# Patient Record
Sex: FEMALE | Race: BLACK OR AFRICAN AMERICAN | NOT HISPANIC OR LATINO | Employment: FULL TIME | URBAN - METROPOLITAN AREA
[De-identification: names, ages, dates, MRNs, and addresses within clinical notes are randomized per-mention and may not be internally consistent; named-entity substitution may affect disease eponyms.]

---

## 2020-11-02 ENCOUNTER — HISTORICAL (OUTPATIENT)
Dept: LAB | Facility: HOSPITAL | Age: 55
End: 2020-11-02

## 2020-11-02 LAB
ABS NEUT (OLG): 6.22
ALBUMIN SERPL-MCNC: 4.1 GM/DL (ref 3.5–5)
ALBUMIN/GLOB SERPL: 1 RATIO (ref 1.1–2)
ALP SERPL-CCNC: 145 UNIT/L (ref 40–150)
ALT SERPL-CCNC: 15 UNIT/L (ref 0–55)
ANISOCYTOSIS BLD QL SMEAR: SLIGHT
AST SERPL-CCNC: 21 UNIT/L (ref 5–34)
BASOPHILS # BLD AUTO: 0.08 X10(3)/MCL
BASOPHILS NFR BLD AUTO: 0.8 %
BILIRUB SERPL-MCNC: 0.3 MG/DL
BILIRUBIN DIRECT+TOT PNL SERPL-MCNC: 0.1 MG/DL (ref 0–0.5)
BILIRUBIN DIRECT+TOT PNL SERPL-MCNC: 0.2 MG/DL
BUN SERPL-MCNC: 14 MG/DL (ref 9.8–20.1)
CALCIUM SERPL-MCNC: 9.5 MG/DL (ref 8.4–10.2)
CHLORIDE SERPL-SCNC: 101 MMOL/L (ref 98–107)
CO2 SERPL-SCNC: 29 MEQ/L (ref 22–29)
CREAT SERPL-MCNC: 0.77 MG/DL (ref 0.55–1.02)
EOSINOPHIL # BLD AUTO: 0.43 X10(3)/MCL
EOSINOPHIL NFR BLD AUTO: 4.3 %
ERYTHROCYTE [DISTWIDTH] IN BLOOD BY AUTOMATED COUNT: 14 %
GGT SERPL-CCNC: 20 U/L (ref 9–36)
GLOBULIN SER-MCNC: 4 GM/DL (ref 2.4–3.5)
GLUCOSE SERPL-MCNC: 97 MG/DL (ref 74–100)
HCT VFR BLD AUTO: 43.6 % (ref 34–46)
HGB BLD-MCNC: 14.4 GM/DL (ref 11.3–15.4)
IMM GRANULOCYTES # BLD AUTO: 0.02 10*3/UL (ref 0–0.1)
IMM GRANULOCYTES NFR BLD AUTO: 0.2 % (ref 0–1)
LYMPHOCYTES # BLD AUTO: 2.3 X10(3)/MCL
LYMPHOCYTES NFR BLD AUTO: 22.9 %
MACROCYTES BLD QL SMEAR: SLIGHT
MCH RBC QN AUTO: 26.2 PG (ref 27–33)
MCHC RBC AUTO-ENTMCNC: 33 GM/DL (ref 32–35)
MCV RBC AUTO: 79.4 FL (ref 81–97)
MICROCYTES BLD QL SMEAR: ABNORMAL
MONOCYTES # BLD AUTO: 1 X10(3)/MCL
MONOCYTES NFR BLD AUTO: 10 %
NEUTROPHILS # BLD AUTO: 6.22 X10(3)/MCL
NEUTROPHILS NFR BLD AUTO: 61.8 %
PLATELET # BLD AUTO: 1017 X10(3)/MCL (ref 140–450)
PLATELET # BLD EST: ABNORMAL 10*3/UL
PMV BLD AUTO: 8 FL
POTASSIUM SERPL-SCNC: 3.7 MMOL/L (ref 3.5–5.1)
PROT SERPL-MCNC: 8.1 GM/DL (ref 6.4–8.3)
RBC # BLD AUTO: 5.49 X10(6)/MCL (ref 3.9–5)
SODIUM SERPL-SCNC: 138 MMOL/L (ref 136–145)
WBC # SPEC AUTO: 10.05 X10(3)/MCL (ref 3.4–9.2)

## 2021-10-06 ENCOUNTER — HISTORICAL (OUTPATIENT)
Dept: RADIOLOGY | Facility: HOSPITAL | Age: 56
End: 2021-10-06

## 2022-04-10 ENCOUNTER — HISTORICAL (OUTPATIENT)
Dept: ADMINISTRATIVE | Facility: HOSPITAL | Age: 57
End: 2022-04-10

## 2022-04-26 VITALS
SYSTOLIC BLOOD PRESSURE: 125 MMHG | WEIGHT: 153 LBS | HEIGHT: 67 IN | BODY MASS INDEX: 24.01 KG/M2 | OXYGEN SATURATION: 98 % | DIASTOLIC BLOOD PRESSURE: 77 MMHG

## 2022-05-24 ENCOUNTER — OFFICE VISIT (OUTPATIENT)
Dept: HEMATOLOGY/ONCOLOGY | Facility: CLINIC | Age: 57
End: 2022-05-24
Payer: COMMERCIAL

## 2022-05-24 VITALS
OXYGEN SATURATION: 100 % | HEART RATE: 76 BPM | BODY MASS INDEX: 22.38 KG/M2 | DIASTOLIC BLOOD PRESSURE: 80 MMHG | WEIGHT: 147.69 LBS | SYSTOLIC BLOOD PRESSURE: 143 MMHG | RESPIRATION RATE: 20 BRPM | HEIGHT: 68 IN | TEMPERATURE: 98 F

## 2022-05-24 DIAGNOSIS — D47.3 ESSENTIAL THROMBOCYTOSIS: Primary | ICD-10-CM

## 2022-05-24 PROCEDURE — 99214 OFFICE O/P EST MOD 30 MIN: CPT | Mod: ,,, | Performed by: NURSE PRACTITIONER

## 2022-05-24 PROCEDURE — 99214 PR OFFICE/OUTPT VISIT, EST, LEVL IV, 30-39 MIN: ICD-10-PCS | Mod: ,,, | Performed by: NURSE PRACTITIONER

## 2022-05-24 RX ORDER — HYDROXYUREA 500 MG/1
1000 CAPSULE ORAL DAILY
Qty: 60 CAPSULE | Refills: 5 | Status: SHIPPED | OUTPATIENT
Start: 2022-05-24 | End: 2023-01-17 | Stop reason: SDUPTHER

## 2022-05-24 RX ORDER — HYDROXYUREA 500 MG/1
1000 CAPSULE ORAL DAILY
COMMUNITY
Start: 2022-04-14 | End: 2022-05-24 | Stop reason: SDUPTHER

## 2022-05-24 RX ORDER — ASPIRIN 81 MG/1
81 TABLET ORAL DAILY
COMMUNITY

## 2022-05-27 NOTE — PROGRESS NOTES
[facility]    PATIENT: Colin Coley  MRN: [id number]  DATE: 5/27/2022    Diagnosis:   1. Essential thrombocytosis        Chief Complaint: no complaints.     Oncologic History:  Diagnosis  Essential (hemorrhagic) thrombocythemia -   Essential (hemorrhagic) thrombocythemia -   Essential thrombocytosis - 07/28/2021  Essential thrombocytosis - 06/14/2021  Essential thrombocytosis - 04/28/2021  Essential thrombocytosis - 03/17/2021  Essential thrombocytosis - 02/08/2021  Essential thrombocytosis - 01/06/2021  Thrombocytosis - 12/08/2020  Thrombocytosis - 11/11/2020  Stage  No qualifying data available  Treatment Plan  No qualifying data available     Problem List:   1.  Essential Thrombocytosis CALR +  2.  Leukocytosis    Treatment Plan:  1. hydroxyurea 1000mg Q day initiated 1/6/2021  ASA 81 mg a day    Diagnosis/Treatment/HPI:   55-year-old healthy female who presents for evaluation of thrombocytosis.  Per the patient's report, she was told that she had elevated platelet count in 2018.  She was taking care of a lot of people in her family and therefore did not get this worked up.  She went in for a wellness exam on 10/29/2020, blood work was done that revealed a white blood cell count of 10.9, hemoglobin 14.4, hematocrit of 44.8, MCV of 82 with a platelet count of 1,001,000.  Repeat CBC on 11/2/2020 again showed an elevated white blood cell count at 10.05, hemoglobin of 14.4, hematocrit of 43.6, and platelet count of 1,017,000.  A peripheral blood smear on the day showed a microcytic, hypochromic red blood cell population which showed some rouleaux formation and a few nucleated red blood cells.  There was mild leukocytosis with normal leukocyte differential and morphology.  No blasts were seen.  There was marked thrombocytosis with variably sized platelets.  There were also a few small platelet clumps.  Patient was referred to hematology.  She initially saw hematology on 11/11/2020.  At  that visit she stated that she had no problems other than insomnia.  Bone marrow biopsy was done on 12/2/2020.  This revealed a normocellular marrow, 40% with trilineage hematopoiesis, some clustered megakaryocytes, presence of storage iron with no increased blasts.  Flow cytometry showed no diagnostic immunophenotypic abnormalities detected.  Extended reflex panel for myeloproliferative neoplasm did show a CALR mutation.  Patient diagnosed with essential thrombocytopenia       PMHx:  None     PSHx:   1.  DARINEL/BSO in 1992  2.  Breast biopsies for fibrocystic disease  3.  Fibroids of uterus leading to hysterectomy in 1992    Social Hx:   She denies tobacco, alcohol, or illicit drug use.    Family Hx:   Her mother had heart failure requiring a pacemaker.  She has no family history of blood disorders including thrombocytosis.    Subjective:     Interval History:   2/8/2021:PLT  477,000. WBC 6.7 Patient presents today for 4-week follow-up visit since starting Hydrea 1000 mg p.o. daily.  She has been tolerating medication well and taking at bedtime.  States slight throat irritation with med.  Denies difficulty breathing or swallowing.  States she sleeps fine after taking Hydrea at bedtime.  She continues to take aspirin daily.  She has no major issues or complaints. She has not had any new signs or symptoms to suggest cardiovascular disease, stroke, or bleeding.  Denies any new abnormal bruising or bleeding.  No further questions or concerns.    3/17/21  K  WBC 5.7, HGB 12.8, CMP WNL on hydrea 1000mg a day    4/28/21: Patient presents today for 6 week follow-up. She has been on Hydrea 1000 mg daily since January 2021. Her platelet count has trended down nicely currently at 300,000. She does complain of increased fatigue. I suspect we might have to decrease dosing at next visit. WBC is 5.7  H&H is 13 and 38.2.    6/9/21 PLT 460K. She held her hydrea x 2 weeks because she had a sinus infection and  did not know how  antibiotics would affect the hydrea. She resumed her hydrea on june 7th which is the same day that she got her labs. She stopped her vitamins. She is still on ASA.   7/26/2021 WBC 5.3, HGB 12.6, HCT 37.6, PLT 341K  10/27/21 WBC 5.6, HGB 12.6, PLT 318K. 10/25/21 MMG benign. No complaints.    05/24/2022 - patient presents to clinic today for a 6 month follow up visit Essential Thrombocytosis - CALR +/leukocytosis.   She is feeling well. Has no complaints. Denies N/V, mouth sores, diarrhea or constipation. Reports appetite as good.   She continues to take Hydrea 1000 mg daily and ASA 81 mg daily. Reports compliance.   She is to be scheduled later this year for a screening colonoscopy through Dr. Warren.   Up to date on mammogram.     Past Medical History:   Diagnosis Date    Anemia, unspecified     Fibrocystic disease of breast     Sickle cell trait      Past Surgical History:   Procedure Laterality Date    BONE MARROW BIOPSY  12/02/2020    BREAST BIOPSY  02/08/2021    BREAST LUMPECTOMY      Excision of Fibroadenoma of breast      TOTAL ABDOMINAL HYSTERECTOMY W/ BILATERAL SALPINGOOPHORECTOMY  1992     Family History   Problem Relation Age of Onset    Hypertension Mother     Rheum arthritis Father     Bipolar disorder Sister     Hypothyroidism Sister        Social History:  reports that she has never smoked. She has never used smokeless tobacco. She reports previous alcohol use. She reports that she does not use drugs.    Allergies:  Review of patient's allergies indicates:  No Known Allergies    Medications:  Current Outpatient Medications   Medication Sig Dispense Refill    aspirin (ECOTRIN) 81 MG EC tablet Take 81 mg by mouth once daily.      hydroxyurea (HYDREA) 500 mg Cap Take 2 capsules (1,000 mg total) by mouth once daily. 60 capsule 5     No current facility-administered medications for this visit.      Review of Systems   14 point review of systems done in full with pertinent positives as described  "above  Remainder of review systems done in full and unremarkable.    ECOG Performance Status: 0   Objective:      Vitals:   Vitals:    05/24/22 1410   BP: (!) 143/80   BP Location: Right arm   Patient Position: Sitting   BP Method: Medium (Automatic)   Pulse: 76   Resp: 20   Temp: 98.1 °F (36.7 °C)   TempSrc: Oral   SpO2: 100%   Weight: 67 kg (147 lb 11.2 oz)   Height: 5' 8" (1.727 m)     BMI: Body mass index is 22.46 kg/m².    Laboratory results: Normal CBC, CMP 5/19/2022       Imaging: 10/6/21 MG JHONATAN SCREENING BILATERAL      There is no mammographic evidence of malignancy.     Assessment:     1.  Essential thrombocytosis  2.  Leukocytosis  3.  CALR mutation +    Plan:  -We will continue aspirin daily and hydroxyurea 1000 mg p.o. daily. Platelets are stable.     -She will come back in 6 months for labs w/ CBC, CMP.  Will get a CBC and CMP in 3 months. Goal platelet count is 100,000 to  400,000 (Per Dr. Christy).      -It was already explained that this is a type of myeloproliferative neoplasm, suggesting a bone marrow cancer, but it this is very treatable.  The patient voiced understanding.    All questions were answered to the best of my ability and she understands the plan moving forward.      Anisha Anderson NP-C         Orders Placed This Encounter   Procedures    CBC Auto Differential    Comprehensive Metabolic Panel    CBC with Differential     Answers for HPI/ROS submitted by the patient on 5/24/2022  appetite change : No  unexpected weight change: No  mouth sores: No  visual disturbance: No  cough: No  shortness of breath: No  chest pain: No  abdominal pain: No  diarrhea: No  frequency: No  back pain: No  rash: No  headaches: No  adenopathy: No  nervous/ anxious: No      "

## 2022-06-27 ENCOUNTER — OFFICE VISIT (OUTPATIENT)
Dept: FAMILY MEDICINE | Facility: CLINIC | Age: 57
End: 2022-06-27
Payer: COMMERCIAL

## 2022-06-27 VITALS
SYSTOLIC BLOOD PRESSURE: 132 MMHG | TEMPERATURE: 97 F | BODY MASS INDEX: 22.43 KG/M2 | WEIGHT: 148 LBS | HEART RATE: 71 BPM | DIASTOLIC BLOOD PRESSURE: 81 MMHG | RESPIRATION RATE: 20 BRPM | HEIGHT: 68 IN

## 2022-06-27 DIAGNOSIS — J32.0 MAXILLARY SINUSITIS, UNSPECIFIED CHRONICITY: ICD-10-CM

## 2022-06-27 DIAGNOSIS — H60.92 OTITIS EXTERNA OF LEFT EAR, UNSPECIFIED CHRONICITY, UNSPECIFIED TYPE: ICD-10-CM

## 2022-06-27 PROCEDURE — 99203 PR OFFICE/OUTPT VISIT, NEW, LEVL III, 30-44 MIN: ICD-10-PCS | Mod: S$GLB,,, | Performed by: NURSE PRACTITIONER

## 2022-06-27 PROCEDURE — 99203 OFFICE O/P NEW LOW 30 MIN: CPT | Mod: S$GLB,,, | Performed by: NURSE PRACTITIONER

## 2022-06-27 RX ORDER — AMOXICILLIN 875 MG/1
875 TABLET, FILM COATED ORAL EVERY 12 HOURS
Qty: 20 TABLET | Refills: 0 | Status: SHIPPED | OUTPATIENT
Start: 2022-06-27 | End: 2022-07-07

## 2022-06-27 RX ORDER — LORATADINE 10 MG/1
10 TABLET ORAL DAILY
Qty: 30 TABLET | Refills: 6 | Status: SHIPPED | OUTPATIENT
Start: 2022-06-27 | End: 2022-11-29

## 2022-06-27 NOTE — PROGRESS NOTES
Subjective:       Patient ID: Colin Coley is a 56 y.o. female.    Chief Complaint: Otalgia (Left earache, sore throat X's 5 days)    Vitals:    06/27/22 1127   BP: 132/81   Pulse: 71   Resp: 20   Temp: 97.3 °F (36.3 °C)        The patient is a 56-year-old female who presents complaining of left upper jaw pain and left ear pain.  Also states she has a  sore throat.  Relief measures at home have been none.      Review of Systems   Constitutional: Positive for fatigue.   HENT: Positive for nasal congestion, postnasal drip, sinus pressure/congestion and sore throat.    Eyes: Negative.    Respiratory: Negative.    Cardiovascular: Negative.    Gastrointestinal: Negative.    Endocrine: Negative.    Genitourinary: Negative.    Musculoskeletal: Negative.    Integumentary:  Negative.   Neurological: Negative.            Objective:        Physical Exam  Vitals reviewed.   Constitutional:       Appearance: Normal appearance.   HENT:      Ears:      Comments: TMs are bulging bilaterally.  Left ear canal near TM pus pocket     Nose: Congestion present.      Mouth/Throat:      Mouth: Mucous membranes are moist.      Pharynx: Oropharyngeal exudate and posterior oropharyngeal erythema present.   Eyes:      Extraocular Movements: Extraocular movements intact.   Cardiovascular:      Rate and Rhythm: Normal rate and regular rhythm.      Pulses: Normal pulses.      Heart sounds: No murmur heard.  Pulmonary:      Effort: Pulmonary effort is normal.      Breath sounds: Normal breath sounds.   Abdominal:      General: Bowel sounds are normal.      Palpations: Abdomen is soft.   Musculoskeletal:         General: Normal range of motion.      Cervical back: Normal range of motion and neck supple.   Skin:     General: Skin is warm and dry.   Neurological:      Mental Status: She is alert and oriented to person, place, and time.         Assessment:    Maxillary sinusitis    Otitis externa    Problem List Items Addressed This Visit        ENT     Otitis externa of left ear    Maxillary sinusitis          Plan:       Antibiotics/antihistamines    Cortisporin otic      Past Medical History:   Diagnosis Date    Anemia, unspecified     Fibrocystic disease of breast     Sickle cell trait         Review of patient's allergies indicates:  No Known Allergies     Current Outpatient Medications   Medication Sig Dispense Refill    aspirin (ECOTRIN) 81 MG EC tablet Take 81 mg by mouth once daily.      hydroxyurea (HYDREA) 500 mg Cap Take 2 capsules (1,000 mg total) by mouth once daily. 60 capsule 5    amoxicillin (AMOXIL) 875 MG tablet Take 1 tablet (875 mg total) by mouth every 12 (twelve) hours. for 10 days 20 tablet 0    loratadine (CLARITIN) 10 mg tablet Take 1 tablet (10 mg total) by mouth once daily. 30 tablet 6    neomycin-colist-HC-thonzonium (COLY-MYCIN S) 3.3-3-10-0.5 mg/mL DrpS Place 2 drops into the left ear 3 (three) times daily. 10 mL 0     No current facility-administered medications for this visit.          Patient Active Problem List   Diagnosis    Otitis externa of left ear    Maxillary sinusitis             Past Medical History:   Diagnosis Date    Anemia, unspecified     Fibrocystic disease of breast     Sickle cell trait           Past Surgical History:   Procedure Laterality Date    BONE MARROW BIOPSY  12/02/2020    BREAST BIOPSY  02/08/2021    BREAST LUMPECTOMY      Excision of Fibroadenoma of breast      HYSTERECTOMY      TOTAL ABDOMINAL HYSTERECTOMY W/ BILATERAL SALPINGOOPHORECTOMY  1992           reports that she has never smoked. She has never used smokeless tobacco. She reports previous alcohol use. She reports that she does not use drugs.      There is no immunization history on file for this patient.     Health Maintenance   Topic Date Due    Hepatitis C Screening  Never done    Lipid Panel  Never done    TETANUS VACCINE  Never done    Mammogram  10/06/2022

## 2022-09-06 DIAGNOSIS — D47.3 ESSENTIAL THROMBOCYTOSIS: ICD-10-CM

## 2022-09-06 RX ORDER — HYDROXYUREA 500 MG/1
1000 CAPSULE ORAL DAILY
Qty: 60 CAPSULE | Refills: 5 | OUTPATIENT
Start: 2022-09-06

## 2022-09-07 DIAGNOSIS — D47.3 THROMBOCYTHEMIA, ESSENTIAL: Primary | ICD-10-CM

## 2022-09-26 ENCOUNTER — TELEPHONE (OUTPATIENT)
Dept: HEMATOLOGY/ONCOLOGY | Facility: CLINIC | Age: 57
End: 2022-09-26
Payer: COMMERCIAL

## 2022-09-26 NOTE — TELEPHONE ENCOUNTER
----- Message from Anisha Anderson NP sent at 9/23/2022 11:46 AM CDT -----  Regarding: labs for refill on Hydrea  Can you check and see if patient had labs done.   She called and asked for refill on Hydrea around the 8th of September.   I do not see lab results and do not see that her Hydrea was filled.   Thanks, Jigar Luque

## 2022-11-29 ENCOUNTER — OFFICE VISIT (OUTPATIENT)
Dept: HEMATOLOGY/ONCOLOGY | Facility: CLINIC | Age: 57
End: 2022-11-29
Payer: COMMERCIAL

## 2022-11-29 VITALS
BODY MASS INDEX: 23.52 KG/M2 | TEMPERATURE: 98 F | WEIGHT: 155.19 LBS | RESPIRATION RATE: 20 BRPM | DIASTOLIC BLOOD PRESSURE: 81 MMHG | OXYGEN SATURATION: 98 % | HEIGHT: 68 IN | HEART RATE: 74 BPM | SYSTOLIC BLOOD PRESSURE: 143 MMHG

## 2022-11-29 DIAGNOSIS — Z12.31 BREAST CANCER SCREENING BY MAMMOGRAM: ICD-10-CM

## 2022-11-29 DIAGNOSIS — D47.3 ESSENTIAL THROMBOCYTOSIS: Primary | ICD-10-CM

## 2022-11-29 DIAGNOSIS — Z12.11 ENCOUNTER FOR SCREENING COLONOSCOPY: ICD-10-CM

## 2022-11-29 PROCEDURE — 99214 OFFICE O/P EST MOD 30 MIN: CPT | Mod: ,,, | Performed by: NURSE PRACTITIONER

## 2022-11-29 PROCEDURE — 99214 PR OFFICE/OUTPT VISIT, EST, LEVL IV, 30-39 MIN: ICD-10-PCS | Mod: ,,, | Performed by: NURSE PRACTITIONER

## 2022-11-29 NOTE — PROGRESS NOTES
[facility]    PATIENT: Colin Coley  MRN: [id number]  DATE: 11/29/2022    Diagnosis:   1.  Essential thrombocytosis  2.  Leukocytosis  3.  CALR mutation +    Chief Complaint:  Fatigue    Oncologic History:  Diagnosis  Essential (hemorrhagic) thrombocythemia -   Essential (hemorrhagic) thrombocythemia -   Essential thrombocytosis - 07/28/2021  Essential thrombocytosis - 06/14/2021  Essential thrombocytosis - 04/28/2021  Essential thrombocytosis - 03/17/2021  Essential thrombocytosis - 02/08/2021  Essential thrombocytosis - 01/06/2021  Thrombocytosis - 12/08/2020  Thrombocytosis - 11/11/2020  Stage  No qualifying data available  Treatment Plan  No qualifying data available     Problem List:   1.  Essential Thrombocytosis CALR +  2.  Leukocytosis    Treatment Plan:  1. hydroxyurea 1000mg Q day initiated 1/6/2021  ASA 81 mg a day    Diagnosis/Treatment/HPI:   55-year-old healthy female who presents for evaluation of thrombocytosis.  Per the patient's report, she was told that she had elevated platelet count in 2018.  She was taking care of a lot of people in her family and therefore did not get this worked up.  She went in for a wellness exam on 10/29/2020, blood work was done that revealed a white blood cell count of 10.9, hemoglobin 14.4, hematocrit of 44.8, MCV of 82 with a platelet count of 1,001,000.  Repeat CBC on 11/2/2020 again showed an elevated white blood cell count at 10.05, hemoglobin of 14.4, hematocrit of 43.6, and platelet count of 1,017,000.  A peripheral blood smear on the day showed a microcytic, hypochromic red blood cell population which showed some rouleaux formation and a few nucleated red blood cells.  There was mild leukocytosis with normal leukocyte differential and morphology.  No blasts were seen.  There was marked thrombocytosis with variably sized platelets.  There were also a few small platelet clumps.  Patient was referred to hematology.  She initially saw  hematology on 11/11/2020.  At that visit she stated that she had no problems other than insomnia.  Bone marrow biopsy was done on 12/2/2020.  This revealed a normocellular marrow, 40% with trilineage hematopoiesis, some clustered megakaryocytes, presence of storage iron with no increased blasts.  Flow cytometry showed no diagnostic immunophenotypic abnormalities detected.  Extended reflex panel for myeloproliferative neoplasm did show a CALR mutation.  Patient diagnosed with essential thrombocytopenia       PMHx:  None     PSHx:   1.  DARINEL/BSO in 1992  2.  Breast biopsies for fibrocystic disease  3.  Fibroids of uterus leading to hysterectomy in 1992    Social Hx:   She denies tobacco, alcohol, or illicit drug use.    Family Hx:   Her mother had heart failure requiring a pacemaker.  She has no family history of blood disorders including thrombocytosis.    Subjective:     Interval History:   11/29/2022: patient presents to clinic today for a 6 month follow up visit for ET(Essential thrombocytosis).   She has been taking Hydrea 1000 mg and ASA 81 mg, but not on a consistent basis.  She takes Hydrea at bedtime when she does take it, 2 capsules daily  States she will stop taking Hydrea if she has to take another medication like a cold medication or nonsteroidal anti-inflammatory.    States she is concerned about potential medication interactions and that is why she does this.  She denies any potential side effects of Hydrea, such as hair thinning or loss, nausea or vomiting, diarrhea, mouth sores or decrease in appetite.  She is not been in the hospital or the ER since last visit.  She has not had any signs or symptoms to suggest cardiovascular disease, stroke, blood clots or bleeding.   She did not make the appointment for screening colonoscopy through Dr. Warren.  States they never contacted her for an appointment.  She never called to follow-up.  She is due for a mammogram.  Last mammogram was October 2021.  Normal.   "She does not perform self-breast exams.  She reports stress in her life from extended family living with her.  States causes her trouble sleeping.  Inquires what she can take.    Past Medical History:   Diagnosis Date    Anemia, unspecified     Fibrocystic disease of breast     Sickle cell trait      Past Surgical History:   Procedure Laterality Date    BONE MARROW BIOPSY  12/02/2020    BREAST BIOPSY  02/08/2021    BREAST LUMPECTOMY      Excision of Fibroadenoma of breast      HYSTERECTOMY      TOTAL ABDOMINAL HYSTERECTOMY W/ BILATERAL SALPINGOOPHORECTOMY  1992     Family History   Problem Relation Age of Onset    Hypertension Mother     Rheum arthritis Father     Bipolar disorder Sister     Hypothyroidism Sister        Social History:  reports that she has never smoked. She has never used smokeless tobacco. She reports that she does not currently use alcohol. She reports that she does not use drugs.    Allergies:  Review of patient's allergies indicates:  No Known Allergies    Medications:  Current Outpatient Medications   Medication Sig Dispense Refill    aspirin (ECOTRIN) 81 MG EC tablet Take 81 mg by mouth once daily.      hydroxyurea (HYDREA) 500 mg Cap Take 2 capsules (1,000 mg total) by mouth once daily. 60 capsule 5    loratadine (CLARITIN) 10 mg tablet Take 1 tablet (10 mg total) by mouth once daily. 30 tablet 6    neomycin-colist-HC-thonzonium (COLY-MYCIN S) 3.3-3-10-0.5 mg/mL DrpS Place 2 drops into the left ear 3 (three) times daily. 10 mL 0     No current facility-administered medications for this visit.      Review of Systems   14 point review of systems done in full with pertinent positives as described above  Remainder of review systems done in full and unremarkable.    ECOG Performance Status: 0   Objective:   Vitals:Blood pressure (!) 143/81, pulse 74, temperature 97.9 °F (36.6 °C), temperature source Oral, resp. rate 20, height 5' 8" (1.727 m), weight 70.4 kg (155 lb 3.2 oz), SpO2 98 " %.    Physical exam:  General: Alert and oriented. Well groomed. Well nourished. No acute distress.   Eye: PERRL, EOMI, clear conjunctiva, sclerae anicteric  Neck:  Supple, no thyromegaly or lymphadenopathy.   Chest/breasts:  Bilateral breasts are without nodules, masses or tenderness.  Bilateral nipples are inverted.  No discharge. Skin is without erythema, puckering or peau d' orange.   Respiratory: clear to auscultation bilaterally  Cardiovascular: regular rate and rhythm without murmurs, gallops or rubs, No LE edema.   Gastrointestinal: soft, non-tender, non-distended with normal bowel sounds, without masses to palpation  Musculoskeletal: Good range of motion of all extremities/no spinal tenderness with palpation.   Integumentary: no rashes or skin lesions present  Neurologic: cranial nerves intact, no signs of peripheral neurological deficit, motor/sensory function grossly intact  Lymphatics: No cervical, axillary, or inguinal nodes.    Laboratory results:   11/23/2022:  WBC 5.9, hemoglobin 14.1, MCV 87, platelets 433, creatinine 0.78, alkaline phos 142, AST 24, ALT 23  Normal CBC, CMP 5/19/2022     Imaging: 10/6/21 MG JHONATAN SCREENING BILATERAL: There is no mammographic evidence of malignancy.     Assessment:     1.  Essential thrombocytosis - platelets are mildly elevated above the upper limit of set goal (400,000) on recent labs due to patient not taking Hydrea or aspirin as directed.       Discussed with patient the importance of taking Hydrea as directed.  2.  Leukocytosis - normal WBC/ANC on recent labs  3.  CALR mutation +    4. Health maintenance.  In office clinical exam performed.  Negative.  Bilateral screening mammogram ordered today.  We will call her with results once available.  Education on why and how to perform self-breast exams provided today.  Encouraged her to perform self-breast exams monthly.  --sent another gastrointestinal referral to Dr. Warren's office for screening baseline colonoscopy.   Patient denies any GI complaints.    5. Insomnia.  Most likely stress related(patient reports family issues currently). Sleep hygiene discussed with patient.  Printed literature provided.  Advised she can try some over-the-counter melatonin or Benadryl as needed.  Follow-up with primary care provider if insomnia continues.   6. Alk phos elevation. Advised patient could be due to OTC cold remedies. Will recheck prior to next appointment.     Plan:  She will continue Hydrea 500 mg, 2 tablets daily and aspirin 81 mg daily.  No refills needed per patient.   We will see her back in 1 month, to ensure platelets are improving and that she is taking Hydrea as directed.  Reviewed in office with her today common over-the-counter medications that she can take with Hydrea.  She is call the office if she has any concerns/questions.      Anisha Anderson NP-C      Answers submitted by the patient for this visit:  Review of Systems Questionnaire (Submitted on 11/28/2022)  appetite change : No  unexpected weight change: No  mouth sores: No  visual disturbance: No  cough: No  shortness of breath: No  chest pain: No  abdominal pain: No  diarrhea: No  frequency: No  back pain: No  rash: No  headaches: No  adenopathy: No  nervous/ anxious: No

## 2022-12-06 ENCOUNTER — HOSPITAL ENCOUNTER (OUTPATIENT)
Dept: RADIOLOGY | Facility: HOSPITAL | Age: 57
Discharge: HOME | End: 2022-12-06
Attending: NURSE PRACTITIONER
Payer: COMMERCIAL

## 2022-12-06 DIAGNOSIS — Z12.31 BREAST CANCER SCREENING BY MAMMOGRAM: ICD-10-CM

## 2022-12-06 PROCEDURE — 77067 SCR MAMMO BI INCL CAD: CPT | Mod: TC

## 2022-12-06 PROCEDURE — 77063 MAMMO DIGITAL SCREENING BILAT WITH TOMO: ICD-10-PCS | Mod: 26,,, | Performed by: STUDENT IN AN ORGANIZED HEALTH CARE EDUCATION/TRAINING PROGRAM

## 2022-12-06 PROCEDURE — 77063 BREAST TOMOSYNTHESIS BI: CPT | Mod: 26,,, | Performed by: STUDENT IN AN ORGANIZED HEALTH CARE EDUCATION/TRAINING PROGRAM

## 2022-12-06 PROCEDURE — 77067 MAMMO DIGITAL SCREENING BILAT WITH TOMO: ICD-10-PCS | Mod: 26,,, | Performed by: STUDENT IN AN ORGANIZED HEALTH CARE EDUCATION/TRAINING PROGRAM

## 2022-12-06 PROCEDURE — 77067 SCR MAMMO BI INCL CAD: CPT | Mod: 26,,, | Performed by: STUDENT IN AN ORGANIZED HEALTH CARE EDUCATION/TRAINING PROGRAM

## 2022-12-06 PROCEDURE — 77063 BREAST TOMOSYNTHESIS BI: CPT | Mod: TC

## 2023-01-17 ENCOUNTER — OFFICE VISIT (OUTPATIENT)
Dept: HEMATOLOGY/ONCOLOGY | Facility: CLINIC | Age: 58
End: 2023-01-17
Payer: COMMERCIAL

## 2023-01-17 VITALS
HEART RATE: 76 BPM | HEIGHT: 68 IN | BODY MASS INDEX: 23.99 KG/M2 | SYSTOLIC BLOOD PRESSURE: 151 MMHG | RESPIRATION RATE: 20 BRPM | OXYGEN SATURATION: 100 % | TEMPERATURE: 98 F | DIASTOLIC BLOOD PRESSURE: 87 MMHG | WEIGHT: 158.31 LBS

## 2023-01-17 DIAGNOSIS — Z15.89 MONOALLELIC MUTATION OF CALR3 GENE: ICD-10-CM

## 2023-01-17 DIAGNOSIS — D47.3 ESSENTIAL THROMBOCYTOSIS: Primary | ICD-10-CM

## 2023-01-17 PROCEDURE — 99214 OFFICE O/P EST MOD 30 MIN: CPT | Mod: ,,, | Performed by: INTERNAL MEDICINE

## 2023-01-17 PROCEDURE — 99214 PR OFFICE/OUTPT VISIT, EST, LEVL IV, 30-39 MIN: ICD-10-PCS | Mod: ,,, | Performed by: INTERNAL MEDICINE

## 2023-01-17 RX ORDER — HYDROXYUREA 500 MG/1
1000 CAPSULE ORAL DAILY
Qty: 60 CAPSULE | Refills: 11 | Status: SHIPPED | OUTPATIENT
Start: 2023-01-17

## 2023-01-17 NOTE — PROGRESS NOTES
[facility]    PATIENT: Colin Coley  MRN: [id number]  DATE: 1/17/2023    Diagnosis:   1.  Essential thrombocytosis  2.  Leukocytosis  3.  CALR mutation +    Chief Complaint:  Fatigue    Oncologic History:  Diagnosis  CALR POSITIVE Essential (hemorrhagic) thrombocythemia - presented 11/2020 with PLT > 1 million      Stage  No qualifying data available  Treatment Plan  Hydrea 1000 mg a day intiated 1/6/2021     Problem List:   1.  Essential Thrombocytosis CALR + dx 1/2021  2.  Leukocytosis    Treatment Plan:  1. hydroxyurea 1000mg Q day initiated 1/6/2021  ASA 81 mg a day    Diagnosis/Treatment/HPI:   57-year-old healthy female who presented for evaluation of thrombocytosis in 12/2020.  Per the patient's report, she was told that she had elevated platelet count in 2018.  She was taking care of a lot of people in her family and therefore did not get this worked up.  She went in for a wellness exam on 10/29/2020, blood work was done that revealed a white blood cell count of 10.9, hemoglobin 14.4, hematocrit of 44.8, MCV of 82 with a platelet count of 1,001,000.  Repeat CBC on 11/2/2020 again showed an elevated white blood cell count at 10.05, hemoglobin of 14.4, hematocrit of 43.6, and platelet count of 1,017,000.  A peripheral blood smear on the day showed a microcytic, hypochromic red blood cell population which showed some rouleaux formation and a few nucleated red blood cells.  There was mild leukocytosis with normal leukocyte differential and morphology.  No blasts were seen.  There was marked thrombocytosis with variably sized platelets.  There were also a few small platelet clumps.  Patient was referred to hematology.  She initially saw hematology on 11/11/2020.  At that visit she stated that she had no problems other than insomnia.  Bone marrow biopsy was done on 12/2/2020.  This revealed a normocellular marrow, 40% with trilineage hematopoiesis, some clustered megakaryocytes,  presence of storage iron with no increased blasts.  Flow cytometry showed no diagnostic immunophenotypic abnormalities detected.  Extended reflex panel for myeloproliferative neoplasm did show a CALR mutation.  Patient diagnosed with essential thrombocytopenia       Social Hx:   She denies tobacco, alcohol, or illicit drug use.    Family Hx:   Her mother had heart failure requiring a pacemaker.  She has no family history of blood disorders including thrombocytosis.    Subjective:     Interval History:   1/17/23 has colonosocpy due in a few week. CBC is perfect. Tolerating hydrea well; continue current dose of 1000mg a day. 11 refills sent today.   11/29/2022: patient presents to clinic today for a 6 month follow up visit for ET(Essential thrombocytosis).   She has been taking Hydrea 1000 mg and ASA 81 mg, but not on a consistent basis.  She takes Hydrea at bedtime when she does take it, 2 capsules daily  States she will stop taking Hydrea if she has to take another medication like a cold medication or nonsteroidal anti-inflammatory.    States she is concerned about potential medication interactions and that is why she does this.  She denies any potential side effects of Hydrea, such as hair thinning or loss, nausea or vomiting, diarrhea, mouth sores or decrease in appetite.  She is not been in the hospital or the ER since last visit.  She has not had any signs or symptoms to suggest cardiovascular disease, stroke, blood clots or bleeding.   She did not make the appointment for screening colonoscopy through Dr. Warren.  States they never contacted her for an appointment.  She never called to follow-up.  She is due for a mammogram.  Last mammogram was October 2021.  Normal.  She does not perform self-breast exams.  She reports stress in her life from extended family living with her.  States causes her trouble sleeping.  Inquires what she can take.    Past Medical History:   Diagnosis Date    Anemia, unspecified      "Fibrocystic disease of breast     Sickle cell trait      Past Surgical History:   Procedure Laterality Date    BONE MARROW BIOPSY  12/02/2020    BREAST BIOPSY  02/08/2021    BREAST LUMPECTOMY      Excision of Fibroadenoma of breast      HYSTERECTOMY      TOTAL ABDOMINAL HYSTERECTOMY W/ BILATERAL SALPINGOOPHORECTOMY  1992   Hysterectomy for fibroids    Family History   Problem Relation Age of Onset    Hypertension Mother     Rheum arthritis Father     Bipolar disorder Sister     Hypothyroidism Sister        Social History:  reports that she has never smoked. She has never used smokeless tobacco. She reports that she does not currently use alcohol. She reports that she does not use drugs.    Allergies:  Review of patient's allergies indicates:  No Known Allergies    Medications:  Current Outpatient Medications   Medication Sig Dispense Refill    aspirin (ECOTRIN) 81 MG EC tablet Take 81 mg by mouth once daily.      hydroxyurea (HYDREA) 500 mg Cap Take 2 capsules (1,000 mg total) by mouth once daily. 60 capsule 11     No current facility-administered medications for this visit.      Review of Systems   14 point review of systems done in full with pertinent positives as described above  Remainder of review systems done in full and unremarkable.    ECOG Performance Status: 0   Objective:   Vitals:Blood pressure (!) 151/87, pulse 76, temperature 98.2 °F (36.8 °C), temperature source Oral, resp. rate 20, height 5' 8" (1.727 m), weight 71.8 kg (158 lb 4.8 oz), SpO2 100 %.    Physical exam:  General: Alert and oriented. Well groomed. Well nourished. No acute distress.   Eye: PERRL, EOMI, clear conjunctiva, sclerae anicteric  Neck:  Supple, no thyromegaly or lymphadenopathy.   Chest/breasts:  Bilateral breasts are without nodules, masses or tenderness.  Bilateral nipples are inverted.  No discharge. Skin is without erythema, puckering or peau d' orange.   Respiratory: clear to auscultation bilaterally  Cardiovascular: regular " rate and rhythm without murmurs, gallops or rubs, No LE edema.   Gastrointestinal: soft, non-tender, non-distended with normal bowel sounds, without masses to palpation  Musculoskeletal: Good range of motion of all extremities/no spinal tenderness with palpation.   Integumentary: no rashes or skin lesions present  Neurologic: cranial nerves intact, no signs of peripheral neurological deficit, motor/sensory function grossly intact  Lymphatics: No cervical, axillary, or inguinal nodes.    Laboratory results:   1/10/2023 WBC 5.1, HGB 14.3, MCV 89, PLT 384K CMP WNL except alk phos 148  11/23/2022:  WBC 5.9, hemoglobin 14.1, MCV 87, platelets 433, creatinine 0.78, alkaline phos 142, AST 24, ALT 23  Normal CBC, CMP 5/19/2022     Imaging: 10/6/21 MG [facility]ATAN SCREENING BILATERAL: There is no mammographic evidence of malignancy.   11/29/22 MMG benign  Assessment:     1.  Essential thrombocytosis - platelets are below set goal (<400,000)   2.  Leukocytosis - normal WBC/ANC on recent labs  3.  CALR mutation +    4. Health maintenance.  Negative.  Bilateral screening mammogram 11/29/2022 was benign.  Last visit--sent another gastrointestinal referral to Dr. Warren's office for screening baseline colonoscopy.  Patient denies any GI complaints.    5. Insomnia.  Most likely stress related(patient reports family issues currently). Sleep hygiene discussed with patient.  Printed literature provided.  Advised she can try some over-the-counter melatonin or Benadryl as needed.  Follow-up with primary care provider if insomnia continues.   6. Alk phos elevation.     Plan:  She will continue Hydrea 500 mg, 2 tablets daily and aspirin 81 mg daily.  Hyrea refilled x 11 today 1/17/23  We will see her back in 3 month with CBC CMP  Last visit Reviewed with her  common over-the-counter medications that she can take with Hydrea.  She is call the office if she has any concerns/questions.    Tiffany Christy MD

## 2023-02-15 ENCOUNTER — OFFICE VISIT (OUTPATIENT)
Dept: FAMILY MEDICINE | Facility: CLINIC | Age: 58
End: 2023-02-15
Payer: COMMERCIAL

## 2023-02-15 VITALS
HEIGHT: 68 IN | HEART RATE: 78 BPM | TEMPERATURE: 97 F | BODY MASS INDEX: 23.95 KG/M2 | DIASTOLIC BLOOD PRESSURE: 72 MMHG | WEIGHT: 158 LBS | RESPIRATION RATE: 20 BRPM | SYSTOLIC BLOOD PRESSURE: 125 MMHG

## 2023-02-15 DIAGNOSIS — J01.41 ACUTE RECURRENT PANSINUSITIS: ICD-10-CM

## 2023-02-15 DIAGNOSIS — R05.1 ACUTE COUGH: ICD-10-CM

## 2023-02-15 DIAGNOSIS — H60.503 ACUTE OTITIS EXTERNA OF BOTH EARS, UNSPECIFIED TYPE: ICD-10-CM

## 2023-02-15 PROCEDURE — 96372 PR INJECTION,THERAP/PROPH/DIAG2ST, IM OR SUBCUT: ICD-10-PCS | Mod: ,,, | Performed by: NURSE PRACTITIONER

## 2023-02-15 PROCEDURE — 99213 PR OFFICE/OUTPT VISIT, EST, LEVL III, 20-29 MIN: ICD-10-PCS | Mod: 25,,, | Performed by: NURSE PRACTITIONER

## 2023-02-15 PROCEDURE — 96372 THER/PROPH/DIAG INJ SC/IM: CPT | Mod: ,,, | Performed by: NURSE PRACTITIONER

## 2023-02-15 PROCEDURE — 99213 OFFICE O/P EST LOW 20 MIN: CPT | Mod: 25,,, | Performed by: NURSE PRACTITIONER

## 2023-02-15 RX ORDER — AMOXICILLIN 500 MG/1
500 TABLET, FILM COATED ORAL EVERY 12 HOURS
Qty: 14 TABLET | Refills: 0 | Status: SHIPPED | OUTPATIENT
Start: 2023-02-15 | End: 2023-02-22

## 2023-02-15 RX ORDER — BENZONATATE 100 MG/1
200 CAPSULE ORAL 3 TIMES DAILY PRN
Qty: 30 CAPSULE | Refills: 0 | Status: SHIPPED | OUTPATIENT
Start: 2023-02-15 | End: 2023-02-25

## 2023-02-15 RX ORDER — MONTELUKAST SODIUM 10 MG/1
10 TABLET ORAL NIGHTLY
Qty: 30 TABLET | Refills: 0 | Status: SHIPPED | OUTPATIENT
Start: 2023-02-15 | End: 2023-03-17

## 2023-02-15 RX ORDER — AZELASTINE 1 MG/ML
1 SPRAY, METERED NASAL 2 TIMES DAILY
Qty: 3 ML | Refills: 6 | Status: SHIPPED | OUTPATIENT
Start: 2023-02-15 | End: 2023-09-28

## 2023-02-15 RX ORDER — LORATADINE 10 MG/1
10 TABLET ORAL DAILY
Qty: 30 TABLET | Refills: 6 | Status: SHIPPED | OUTPATIENT
Start: 2023-02-15 | End: 2023-09-28

## 2023-02-15 RX ORDER — NEOMYCIN SULFATE, POLYMYXIN B SULFATE AND HYDROCORTISONE 10; 3.5; 1 MG/ML; MG/ML; [USP'U]/ML
3 SUSPENSION/ DROPS AURICULAR (OTIC) 3 TIMES DAILY
Qty: 6 ML | Refills: 0 | Status: SHIPPED | OUTPATIENT
Start: 2023-02-15 | End: 2023-02-25

## 2023-02-15 RX ORDER — METHYLPREDNISOLONE ACETATE 40 MG/ML
40 INJECTION, SUSPENSION INTRA-ARTICULAR; INTRALESIONAL; INTRAMUSCULAR; SOFT TISSUE
Status: COMPLETED | OUTPATIENT
Start: 2023-02-15 | End: 2023-02-15

## 2023-02-15 RX ADMIN — METHYLPREDNISOLONE ACETATE 40 MG: 40 INJECTION, SUSPENSION INTRA-ARTICULAR; INTRALESIONAL; INTRAMUSCULAR; SOFT TISSUE at 04:02

## 2023-02-15 NOTE — PROGRESS NOTES
"Subjective:       Patient ID: Colin Coley is a 57 y.o. female.    Chief Complaint: Sinus Problem (Sinus drip, HA, congestion, sore throat X's 1 week)      The patient is a 57-year-old female who presents for sinus congestion, sinus headache, sinus drip, sore throat and a cough.  Relief measures at home have been none    Review of [id number] point review of systems conducted, negative except as stated in the history of present illness. See HPI for details.      Objective:      Visit Vitals  /72   Pulse 78   Temp 97.3 °F (36.3 °C) (Temporal)   Resp 20   Ht 5' 8" (1.727 m)   Wt 71.7 kg (158 lb)   BMI 24.02 kg/m²     Physical Exam  Vitals and nursing note reviewed.   HENT:      Head: Normocephalic.      Ears:      Comments: Bilateral TMs bulging with effusion    Bilateral ear canals erythemic and swollen     Nose: Nose normal.      Mouth/Throat:      Mouth: Mucous membranes are moist.   Eyes:      Extraocular Movements: Extraocular movements intact.   Cardiovascular:      Rate and Rhythm: Normal rate and regular rhythm.      Heart sounds: No murmur heard.  Abdominal:      Palpations: Abdomen is soft.   Musculoskeletal:         General: Normal range of motion.      Cervical back: Normal range of motion and neck supple.   Skin:     General: Skin is warm and dry.   Neurological:      Mental Status: She is alert and oriented to person, place, and time.     Current Outpatient Medications   Medication Instructions    amoxicillin (AMOXIL) 500 mg, Oral, Every 12 hours    aspirin (ECOTRIN) 81 mg, Oral, Daily    azelastine (ASTELIN) 137 mcg, Nasal, 2 times daily    benzonatate (TESSALON) 200 mg, Oral, 3 times daily PRN    hydroxyurea (HYDREA) 1,000 mg, Oral, Daily    loratadine (CLARITIN) 10 mg, Oral, Daily    montelukast (SINGULAIR) 10 mg, Oral, Nightly    neomycin-polymyxin-hydrocortisone (CORTISPORIN) 3.5-10,000-1 mg/mL-unit/mL-% otic suspension 3 drops, Both Ears, 3 times daily     has No Known Allergies.     "   Assessment:         ICD-10-CM ICD-9-CM   1. Acute recurrent pansinusitis  J01.41 461.8   2. Acute cough  R05.1 786.2   3. Acute otitis externa of both ears, unspecified type  H60.503 380.10          Plan:       1. Acute recurrent pansinusitis  - benzonatate (TESSALON) 100 MG capsule; Take 2 capsules (200 mg total) by mouth 3 (three) times daily as needed for Cough.  Dispense: 30 capsule; Refill: 0  - montelukast (SINGULAIR) 10 mg tablet; Take 1 tablet (10 mg total) by mouth every evening.  Dispense: 30 tablet; Refill: 0  - loratadine (CLARITIN) 10 mg tablet; Take 1 tablet (10 mg total) by mouth once daily.  Dispense: 30 tablet; Refill: 6  - amoxicillin (AMOXIL) 500 MG Tab; Take 1 tablet (500 mg total) by mouth every 12 (twelve) hours. for 7 days  Dispense: 14 tablet; Refill: 0  - azelastine (ASTELIN) 137 mcg (0.1 %) nasal spray; 1 spray (137 mcg total) by Nasal route 2 (two) times daily.  Dispense: 3 mL; Refill: 6  - methylPREDNISolone acetate injection 40 mg given in the right deltoid by [place] LPN  The patient instructed to hydrate with 468 oz glasses non caffeinated beverages daily.    2. Acute cough  - benzonatate (TESSALON) 100 MG capsule; Take 2 capsules (200 mg total) by mouth 3 (three) times daily as needed for Cough.  Dispense: 30 capsule; Refill: 0  - montelukast (SINGULAIR) 10 mg tablet; Take 1 tablet (10 mg total) by mouth every evening.  Dispense: 30 tablet; Refill: 0  - loratadine (CLARITIN) 10 mg tablet; Take 1 tablet (10 mg total) by mouth once daily.  Dispense: 30 tablet; Refill: 6  - amoxicillin (AMOXIL) 500 MG Tab; Take 1 tablet (500 mg total) by mouth every 12 (twelve) hours. for 7 days  Dispense: 14 tablet; Refill: 0  - azelastine (ASTELIN) 137 mcg (0.1 %) nasal spray; 1 spray (137 mcg total) by Nasal route 2 (two) times daily.  Dispense: 3 mL; Refill: 6    3. Acute otitis externa of both ears, unspecified type  - neomycin-polymyxin-hydrocortisone (CORTISPORIN) 3.5-10,000-1  mg/mL-unit/mL-% otic suspension; Place 3 drops into both ears 3 (three) times daily. for 10 days  Dispense: 6 mL; Refill: 0    Patient to return for well-woman exam  Call the office with any questions or concerns        No follow-ups on file.     Future Appointments   Date Time Provider Department Center   4/18/2023  2:20 PM PROVIDER, [facility] HEMATOLOGY ONCOLOGY [facility] HEMONC [place]

## 2023-03-23 ENCOUNTER — PATIENT OUTREACH (OUTPATIENT)
Dept: ADMINISTRATIVE | Facility: HOSPITAL | Age: 58
End: 2023-03-23
Payer: COMMERCIAL

## 2023-03-23 NOTE — PROGRESS NOTES
[facility] Health Outreach call. Called patient regarding Colorectal CA Screening to see when and where he/she would have had it done, no answer and no vm set up.    If you have any questions please give me a call.  Lisha Joe MA-Panel Care Coordinator at [phone].

## 2023-05-01 ENCOUNTER — PATIENT MESSAGE (OUTPATIENT)
Dept: ADMINISTRATIVE | Facility: HOSPITAL | Age: 58
End: 2023-05-01
Payer: COMMERCIAL

## 2023-05-09 ENCOUNTER — PATIENT MESSAGE (OUTPATIENT)
Dept: RESEARCH | Facility: HOSPITAL | Age: 58
End: 2023-05-09
Payer: COMMERCIAL

## 2023-05-22 PROBLEM — J01.41 ACUTE RECURRENT PANSINUSITIS: Status: RESOLVED | Noted: 2023-02-15 | Resolved: 2023-05-22

## 2023-07-24 ENCOUNTER — PATIENT MESSAGE (OUTPATIENT)
Dept: ADMINISTRATIVE | Facility: HOSPITAL | Age: 58
End: 2023-07-24
Payer: COMMERCIAL

## 2023-09-28 ENCOUNTER — OFFICE VISIT (OUTPATIENT)
Dept: FAMILY MEDICINE | Facility: CLINIC | Age: 58
End: 2023-09-28
Payer: COMMERCIAL

## 2023-09-28 VITALS
BODY MASS INDEX: 23.64 KG/M2 | WEIGHT: 156 LBS | DIASTOLIC BLOOD PRESSURE: 85 MMHG | HEIGHT: 68 IN | TEMPERATURE: 97 F | RESPIRATION RATE: 20 BRPM | HEART RATE: 77 BPM | SYSTOLIC BLOOD PRESSURE: 134 MMHG

## 2023-09-28 DIAGNOSIS — T14.8XXA PULLED MUSCLE: Primary | ICD-10-CM

## 2023-09-28 PROCEDURE — 99213 OFFICE O/P EST LOW 20 MIN: CPT | Mod: 25,,, | Performed by: NURSE PRACTITIONER

## 2023-09-28 PROCEDURE — 99213 PR OFFICE/OUTPT VISIT, EST, LEVL III, 20-29 MIN: ICD-10-PCS | Mod: 25,,, | Performed by: NURSE PRACTITIONER

## 2023-09-28 PROCEDURE — 20552 NJX 1/MLT TRIGGER POINT 1/2: CPT | Mod: ,,, | Performed by: NURSE PRACTITIONER

## 2023-09-28 PROCEDURE — 20552 TRIGGER POINT INJECTION: ICD-10-PCS | Mod: ,,, | Performed by: NURSE PRACTITIONER

## 2023-09-28 RX ORDER — METHYLPREDNISOLONE ACETATE 40 MG/ML
40 INJECTION, SUSPENSION INTRA-ARTICULAR; INTRALESIONAL; INTRAMUSCULAR; SOFT TISSUE
Status: DISCONTINUED | OUTPATIENT
Start: 2023-09-28 | End: 2023-09-28 | Stop reason: HOSPADM

## 2023-09-28 RX ORDER — KETOROLAC TROMETHAMINE 10 MG/1
10 TABLET, FILM COATED ORAL EVERY 6 HOURS
Qty: 20 TABLET | Refills: 0 | Status: SHIPPED | OUTPATIENT
Start: 2023-09-28 | End: 2023-10-03

## 2023-09-28 RX ORDER — CYCLOBENZAPRINE HCL 5 MG
5 TABLET ORAL 3 TIMES DAILY PRN
Qty: 30 TABLET | Refills: 0 | Status: SHIPPED | OUTPATIENT
Start: 2023-09-28 | End: 2023-10-08

## 2023-09-28 RX ORDER — LIDOCAINE HYDROCHLORIDE 10 MG/ML
1 INJECTION, SOLUTION EPIDURAL; INFILTRATION; INTRACAUDAL; PERINEURAL
Status: DISCONTINUED | OUTPATIENT
Start: 2023-09-28 | End: 2023-09-28 | Stop reason: HOSPADM

## 2023-09-28 RX ADMIN — METHYLPREDNISOLONE ACETATE 40 MG: 40 INJECTION, SUSPENSION INTRA-ARTICULAR; INTRALESIONAL; INTRAMUSCULAR; SOFT TISSUE at 04:09

## 2023-09-28 RX ADMIN — LIDOCAINE HYDROCHLORIDE 1 ML: 10 INJECTION, SOLUTION EPIDURAL; INFILTRATION; INTRACAUDAL; PERINEURAL at 04:09

## 2023-09-28 NOTE — PROCEDURES
Trigger Point Injection    Performed by: Clarice Thomas NP  Authorized by: Clarice Thomas NP      Consent Done?:  Yes (Verbal)    Pre-Procedure:   Indications:  Pain and pain relief  Site marked: the procedure site was marked     Timeout: prior to procedure the correct patient, procedure, and site was verified        Local anesthesia used?: No    Medications: 1 mL LIDOcaine (PF) 10 mg/ml (1%) 10 mg/mL (1 %); 40 mg methylPREDNISolone acetate 40 mg/mL  Hamstring:  Left

## 2023-09-28 NOTE — PROGRESS NOTES
"Subjective:       Patient ID: Colin Coley is a 57 y.o. female.    Chief Complaint: Muscle Pain (Pulled muscle in left thigh X's 2 weeks)      The patient is a 57-year-old female who presents due to a pulled muscle.  She states she had not been exercising and  exercised vigorously with painful results.  She tried taking ibuprofen without relief. She also tried taking Aleve without relief.     Pain  The current episode started 1 to 4 weeks ago. The problem occurs daily. The problem has been gradually worsening. The symptoms are aggravated by standing, twisting, bending, walking and exertion. She has tried NSAIDs for the symptoms. The treatment provided no relief.     Review of [id number] point review of systems conducted, negative except as stated in the history of present illness. See HPI for details.      Objective:      Visit Vitals  /85   Pulse 77   Temp 96.9 °F (36.1 °C) (Temporal)   Resp 20   Ht 5' 8" (1.727 m)   Wt 70.8 kg (156 lb)   BMI 23.72 kg/m²     Physical Exam  Vitals and nursing note reviewed.   HENT:      Head: Normocephalic.      Right Ear: Tympanic membrane normal.      Left Ear: Tympanic membrane normal.      Nose: Nose normal.      Mouth/Throat:      Mouth: Mucous membranes are moist.   Eyes:      Extraocular Movements: Extraocular movements intact.   Cardiovascular:      Rate and Rhythm: Normal rate and regular rhythm.      Heart sounds: No murmur heard.  Pulmonary:      Effort: Pulmonary effort is normal.      Breath sounds: Normal breath sounds.   Abdominal:      General: Bowel sounds are normal.      Palpations: Abdomen is soft.   Musculoskeletal:      Cervical back: Normal range of motion and neck supple.      Comments: Limited range of motion to left leg due to pain   Skin:     General: Skin is warm and dry.   Neurological:      Mental Status: She is alert and oriented to person, place, and time.       Current Outpatient Medications   Medication Instructions    aspirin (ECOTRIN) 81 mg, " Oral, Daily    cyclobenzaprine (FLEXERIL) 5 mg, Oral, 3 times daily PRN    hydroxyurea (HYDREA) 1,000 mg, Oral, Daily    ketorolac (TORADOL) 10 mg, Oral, Every 6 hours     has No Known Allergies.       Assessment:         ICD-10-CM ICD-9-CM   1. Pulled muscle  T14.8XXA 848.9          Plan:       1. Pulled muscle  - ketorolac (TORADOL) 10 mg tablet; Take 1 tablet (10 mg total) by mouth every 6 (six) hours. for 5 days  Dispense: 20 tablet; Refill: 0  - Trigger Point Injection - see quick procedure  - LIDOcaine (PF) 10 mg/ml (1%) injection 1 mL  - methylPREDNISolone acetate injection 40 mg        Follow up if symptoms worsen or fail to improve.     No future appointments.

## 2023-12-30 ENCOUNTER — HOSPITAL ENCOUNTER (EMERGENCY)
Facility: HOSPITAL | Age: 58
Discharge: HOME | End: 2023-12-30
Attending: STUDENT IN AN ORGANIZED HEALTH CARE EDUCATION/TRAINING PROGRAM
Payer: COMMERCIAL

## 2023-12-30 VITALS
BODY MASS INDEX: 24.48 KG/M2 | SYSTOLIC BLOOD PRESSURE: 149 MMHG | TEMPERATURE: 96 F | HEIGHT: 67 IN | WEIGHT: 156 LBS | RESPIRATION RATE: 20 BRPM | OXYGEN SATURATION: 97 % | DIASTOLIC BLOOD PRESSURE: 88 MMHG | HEART RATE: 92 BPM

## 2023-12-30 DIAGNOSIS — R10.13 EPIGASTRIC ABDOMINAL PAIN: Primary | ICD-10-CM

## 2023-12-30 DIAGNOSIS — K21.9 GASTROESOPHAGEAL REFLUX DISEASE, UNSPECIFIED WHETHER ESOPHAGITIS PRESENT: ICD-10-CM

## 2023-12-30 PROCEDURE — 99282 EMERGENCY DEPT VISIT SF MDM: CPT

## 2023-12-30 RX ORDER — SUCRALFATE 1 G/1
1 TABLET ORAL 4 TIMES DAILY
COMMUNITY

## 2023-12-30 RX ORDER — FAMOTIDINE 40 MG/1
40 TABLET, FILM COATED ORAL DAILY
Qty: 30 TABLET | Refills: 0 | Status: SHIPPED | OUTPATIENT
Start: 2023-12-30 | End: 2024-01-29

## 2023-12-30 NOTE — ED NOTES
Pt to Ed with c/o issue starting 2 weeks ago while eating boudin that was greasy. Reports BM have changed since then. Denies CP/SOB or fever.

## 2024-06-05 ENCOUNTER — HOSPITAL ENCOUNTER (OUTPATIENT)
Dept: RADIOLOGY | Facility: HOSPITAL | Age: 59
Discharge: HOME | End: 2024-06-05
Attending: NURSE PRACTITIONER
Payer: COMMERCIAL

## 2024-06-05 DIAGNOSIS — D69.3 AUTOIMMUNE THROMBOCYTOPENIA: ICD-10-CM

## 2024-06-05 DIAGNOSIS — Z12.31 ENCOUNTER FOR SCREENING MAMMOGRAM FOR MALIGNANT NEOPLASM OF BREAST: ICD-10-CM

## 2024-06-05 PROCEDURE — 76705 ECHO EXAM OF ABDOMEN: CPT | Mod: TC

## 2024-06-05 PROCEDURE — 77067 SCR MAMMO BI INCL CAD: CPT | Mod: TC

## 2024-06-05 PROCEDURE — 77067 SCR MAMMO BI INCL CAD: CPT | Mod: 26,,, | Performed by: RADIOLOGY

## 2024-06-05 PROCEDURE — 77063 BREAST TOMOSYNTHESIS BI: CPT | Mod: TC

## 2024-06-05 PROCEDURE — 77063 BREAST TOMOSYNTHESIS BI: CPT | Mod: 26,,, | Performed by: RADIOLOGY

## 2025-07-17 ENCOUNTER — HOSPITAL ENCOUNTER (OUTPATIENT)
Dept: RADIOLOGY | Facility: HOSPITAL | Age: 60
Discharge: HOME | End: 2025-07-17
Payer: COMMERCIAL

## 2025-07-17 DIAGNOSIS — Z12.31 ENCOUNTER FOR SCREENING MAMMOGRAM FOR MALIGNANT NEOPLASM OF BREAST: ICD-10-CM

## 2025-07-17 PROCEDURE — 77063 BREAST TOMOSYNTHESIS BI: CPT | Mod: TC
